# Patient Record
Sex: FEMALE | Race: AMERICAN INDIAN OR ALASKA NATIVE
[De-identification: names, ages, dates, MRNs, and addresses within clinical notes are randomized per-mention and may not be internally consistent; named-entity substitution may affect disease eponyms.]

---

## 2021-05-26 ENCOUNTER — HOSPITAL ENCOUNTER (EMERGENCY)
Dept: HOSPITAL 5 - ED | Age: 26
Discharge: HOME | End: 2021-05-26
Payer: SELF-PAY

## 2021-05-26 VITALS — DIASTOLIC BLOOD PRESSURE: 81 MMHG | SYSTOLIC BLOOD PRESSURE: 126 MMHG

## 2021-05-26 DIAGNOSIS — O26.891: Primary | ICD-10-CM

## 2021-05-26 DIAGNOSIS — Z3A.13: ICD-10-CM

## 2021-05-26 DIAGNOSIS — R10.9: ICD-10-CM

## 2021-05-26 DIAGNOSIS — E86.0: ICD-10-CM

## 2021-05-26 LAB
ALBUMIN SERPL-MCNC: 3.8 G/DL (ref 3.9–5)
ALT SERPL-CCNC: 19 UNITS/L (ref 7–56)
BASOPHILS # (AUTO): 0 K/MM3 (ref 0–0.1)
BASOPHILS NFR BLD AUTO: 0.6 % (ref 0–1.8)
BILIRUB UR QL STRIP: (no result)
BLOOD UR QL VISUAL: (no result)
BUN SERPL-MCNC: 3 MG/DL (ref 7–17)
BUN/CREAT SERPL: 6 %
CALCIUM SERPL-MCNC: 9.1 MG/DL (ref 8.4–10.2)
EOSINOPHIL # BLD AUTO: 0.1 K/MM3 (ref 0–0.4)
EOSINOPHIL NFR BLD AUTO: 2.2 % (ref 0–4.3)
HCT VFR BLD CALC: 35.7 % (ref 30.3–42.9)
HEMOLYSIS INDEX: 6
HGB BLD-MCNC: 11.7 GM/DL (ref 10.1–14.3)
LYMPHOCYTES # BLD AUTO: 1.8 K/MM3 (ref 1.2–5.4)
LYMPHOCYTES NFR BLD AUTO: 31 % (ref 13.4–35)
MCHC RBC AUTO-ENTMCNC: 33 % (ref 30–34)
MCV RBC AUTO: 90 FL (ref 79–97)
MONOCYTES # (AUTO): 0.3 K/MM3 (ref 0–0.8)
MONOCYTES % (AUTO): 4.8 % (ref 0–7.3)
MUCOUS THREADS #/AREA URNS HPF: (no result) /HPF
PH UR STRIP: 5 [PH] (ref 5–7)
PLATELET # BLD: 241 K/MM3 (ref 140–440)
RBC # BLD AUTO: 3.94 M/MM3 (ref 3.65–5.03)
RBC #/AREA URNS HPF: 4 /HPF (ref 0–6)
UROBILINOGEN UR-MCNC: < 2 MG/DL (ref ?–2)
WBC #/AREA URNS HPF: 5 /HPF (ref 0–6)

## 2021-05-26 PROCEDURE — 83690 ASSAY OF LIPASE: CPT

## 2021-05-26 PROCEDURE — 99284 EMERGENCY DEPT VISIT MOD MDM: CPT

## 2021-05-26 PROCEDURE — 85025 COMPLETE CBC W/AUTO DIFF WBC: CPT

## 2021-05-26 PROCEDURE — 80053 COMPREHEN METABOLIC PANEL: CPT

## 2021-05-26 PROCEDURE — 81001 URINALYSIS AUTO W/SCOPE: CPT

## 2021-05-26 PROCEDURE — 84702 CHORIONIC GONADOTROPIN TEST: CPT

## 2021-05-26 PROCEDURE — 83735 ASSAY OF MAGNESIUM: CPT

## 2021-05-26 PROCEDURE — 76802 OB US < 14 WKS ADDL FETUS: CPT

## 2021-05-26 PROCEDURE — 36415 COLL VENOUS BLD VENIPUNCTURE: CPT

## 2021-05-26 PROCEDURE — 76801 OB US < 14 WKS SINGLE FETUS: CPT

## 2021-05-26 NOTE — EMERGENCY DEPARTMENT REPORT
ED Female  HPI





- General


Stated complaint: PREGNANT, ABD PAIN, HEADACHE


Time Seen by Provider: 21 14:46


Source: patient


Mode of arrival: Ambulatory


Limitations: No Limitations





- History of Present Illness


Initial comments: 


25-year-old female presents to the ER today with complaints of abdominal pain.  

Patient states that the pain started this morning.  She described as a constant 

pain.  Patient states that she is currently pregnant.  She states that her last 

menstrual cycle was sometime in 2021 but she is not exactly sure how 

many weeks she is.  She did have her first OB/GYN appointment this past Monday 

during which time a day did lab tests and made a follow-up appointment for the 

next 4 weeks.  She states that is an OB/GYN at [place].  Patient denies any 

associated bleeding with the pain.  She denies any abnormal discharge.  She 

denies any nausea, vomiting or UTI symptoms.  She is G3, P2 Ab1.


MD Complaint: pelvic pain, other (pregnant)


-: Sudden (this morning )





- Related Data


                                    Allergies











Allergy/AdvReac Type Severity Reaction Status Date / Time


 


No Known Allergies Allergy   Verified 21 14:47














ED Review of Systems


ROS: 


Stated complaint: PREGNANT, ABD PAIN, HEADACHE


Other details as noted in HPI





Comment: All other systems reviewed and negative


Constitutional: denies: chills, fever


Eyes: denies: eye pain, eye discharge, vision change


ENT: denies: ear pain, throat pain, dental pain, hearing loss, epistaxis, 

congestion


Respiratory: denies: cough, shortness of breath, SOB with exertion, SOB at rest,

wheezing


Cardiovascular: denies: chest pain, palpitations, dyspnea on exertion, 

orthopnea, edema, syncope, paroxysmal nocturnal dyspnea


Gastrointestinal: abdominal pain.  denies: nausea, vomiting, diarrhea, 

constipation, hematemesis, hematochezia


Genitourinary: denies: urgency, dysuria, frequency, hematuria, discharge, 

abnormal menses, dyspareunia


Musculoskeletal: denies: back pain, joint swelling, arthralgia


Skin: denies: rash, lesions, change in color, change in hair/nails, pruritus


Neurological: denies: headache, weakness, numbness, paresthesias, confusion, 

abnormal gait, vertigo


Psychiatric: denies: anxiety, depression, auditory hallucinations, visual 

hallucinations, homicidal thoughts, suicidal thoughts


Hematological/Lymphatic: denies: easy bleeding, easy bruising





ED Past Medical Hx





- Social History


Smoking Status: Never Smoker


Substance Use Type: None





ED Course


                                   Vital Signs











  21





  14:43


 


Temperature 98 F


 


Pulse Rate 92 H


 


Respiratory 16





Rate 


 


Blood Pressure 126/81


 


O2 Sat by Pulse 100





Oximetry 














ED Medical Decision Making





- Lab Data


Result diagrams: 


                                 21 15:14





                                 21 15:14





- Radiology Data


Radiology results: report reviewed


Patient: KINZA PETTY                                                          

     MR#: [id number]  


7          


: 1995                                                                

Acct:[id number]      


 


Age/Sex: 25 / F                                                                

ADM Date: 21     


 


Loc: ED       


Attending Dr:   


 


 


Ordering Physician: MADELINE ARCE  


Date of Service: 21  


Procedure(s): US OB <= 14 wk fetus add gest  


Accession Number(s): [id number]  


 


cc: MADELINE ARCE   


 


 


US OB <= 14 wk fetus add gest  


 


 INDICATION:  


 lower abd pain/+preg/lmc 2021.  


 


 TECHNIQUE:  


 Transabdominal.  


 


 COMPARISON:  


 None.  


 


 FINDINGS:  


 There is a single intrauterine pregnancy.   


 


 Biparietal Diameter = 1.9 cm   


 Head Circumference = 8.2 cm   


 Abdominal Circumference = 7.5 cm   


 Femur Length = 1.5 cm   


 


 


 Fetal Heart Rate: 138  beats per minute.   


 


 Fetal Position: cephalic.   


 Cervix: Not well evaluated on this exam.  


 Placenta: Not well evaluated on this exam  


 Amniotic Fluid Volume: normal   


 Maternal Adnexa: No significant abnormality.  


 


 IMPRESSION:  


 1. Single, living intrauterine pregnancy with estimated sonographic age of 13 

weeks, 5  day(s).  


 2. No significant sonographic abnormality.  


 


 


 Signer Name: Rex Jones MD   


 Signed: 2021 7:02 PM  


 Workstation Name: [id number]   


 


 


Transcribed By: CS  


Dictated By: Rex Jones MD  


Electronically Authenticated By: Rex Jones MD    


Signed Date/Time: 21                                


 


 


 


DD/DT: 21                                                            

 


TD/TT:








- Medical Decision Making


Quant HCG 54120 with US showing single live IUP measuring at about 13 weeks and 

5 days; Nothing else acute noted seen on US


CBC/CMP and UA unremarkable


Pt currently resting comfortably. She is not in any pain distress. She is not 

toxic or ill appearing and appears well hydrated.  He is neurologically intact 

with a normal gait in the ER.  She has non surgical abd exam and therefore not 

concern for acute abdomen requiring  additional imaging/testing indicated at 

this time. Her VS stable. 


Discussed US/lab results with patient. 


Recommend tylenol for pain but recommend close f/u with OBGYN. 


Pt expressed understanding of instructions and agreed with plan. 


Pt was stable at time of d/c. 





Critical care attestation.: 


If time is entered above; I have spent that time in minutes in the direct care 

of this critically ill patient, excluding procedure time.








ED Disposition


Clinical Impression: 


 Abdominal pain during pregnancy, 13 weeks gestation of pregnancy





Disposition: DC- TO HOME OR SELFCARE


Is pt being admited?: No


Does the pt Need Aspirin: No


Condition: Stable


Instructions:  Abdominal Pain During Pregnancy


Additional Instructions: 


Recommend taking Tylenol as needed for pain.  Keep your appointment with the 

OB/GYN next month.  Return to ER if your symptoms changes or worsens in any way.


Referrals: 


PRIMARY CARE,MD [Primary Care Provider] - 3-5 Days


Forms:  Work/School Release Form(ED)


Time of Disposition: 19:09

## 2021-05-26 NOTE — ULTRASOUND REPORT
US OB <= 14 wk fetus add gest



INDICATION:

lower abd pain/+preg/lmc feb 2021.



TECHNIQUE:

Transabdominal.



COMPARISON:

None.



FINDINGS:

There is a single intrauterine pregnancy. 



Biparietal Diameter = 1.9 cm 

Head Circumference = 8.2 cm 

Abdominal Circumference = 7.5 cm 

Femur Length = 1.5 cm 





Fetal Heart Rate: 138  beats per minute. 



Fetal Position: cephalic. 

Cervix: Not well evaluated on this exam.

Placenta: Not well evaluated on this exam

Amniotic Fluid Volume: normal 

Maternal Adnexa: No significant abnormality.



IMPRESSION:

1. Single, living intrauterine pregnancy with estimated sonographic age of 13 weeks, 5  day(s).

2. No significant sonographic abnormality.

 



Signer Name: Rex Jones MD 

Signed: 5/26/2021 7:02 PM

Workstation Name: [id number]

## 2021-12-11 ENCOUNTER — HOSPITAL ENCOUNTER (EMERGENCY)
Dept: HOSPITAL 5 - ED | Age: 26
Discharge: HOME | End: 2021-12-11
Payer: MEDICAID

## 2021-12-11 VITALS — SYSTOLIC BLOOD PRESSURE: 149 MMHG | DIASTOLIC BLOOD PRESSURE: 98 MMHG

## 2021-12-11 LAB
ALBUMIN SERPL-MCNC: 4 G/DL (ref 3.9–5)
ALT SERPL-CCNC: 21 UNITS/L (ref 7–56)
APTT BLD: 31 SEC. (ref 24.2–36.6)
BASOPHILS # (AUTO): 0.1 K/MM3 (ref 0–0.1)
BASOPHILS NFR BLD AUTO: 1.1 % (ref 0–1.8)
BILIRUB UR QL STRIP: (no result)
BLOOD UR QL VISUAL: (no result)
BUN SERPL-MCNC: 9 MG/DL (ref 7–17)
BUN/CREAT SERPL: 15 %
CALCIUM SERPL-MCNC: 9.4 MG/DL (ref 8.4–10.2)
EOSINOPHIL # BLD AUTO: 0.1 K/MM3 (ref 0–0.4)
EOSINOPHIL NFR BLD AUTO: 2.4 % (ref 0–4.3)
FIBRINOGEN PPP-MCNC: 304 MG/DL (ref 211–480)
HCT VFR BLD CALC: 45 % (ref 30.3–42.9)
HEMOLYSIS INDEX: 24
HGB BLD-MCNC: 14.3 GM/DL (ref 10.1–14.3)
INR PPP: 0.92 (ref 0.87–1.13)
LYMPHOCYTES # BLD AUTO: 2.5 K/MM3 (ref 1.2–5.4)
LYMPHOCYTES NFR BLD AUTO: 51.4 % (ref 13.4–35)
MCHC RBC AUTO-ENTMCNC: 32 % (ref 30–34)
MCV RBC AUTO: 88 FL (ref 79–97)
MONOCYTES # (AUTO): 0.5 K/MM3 (ref 0–0.8)
MONOCYTES % (AUTO): 9.4 % (ref 0–7.3)
PH UR STRIP: 6 [PH] (ref 5–7)
PLATELET # BLD: 276 K/MM3 (ref 140–440)
PROT UR STRIP-MCNC: (no result) MG/DL
RBC # BLD AUTO: 5.12 M/MM3 (ref 3.65–5.03)
RBC #/AREA URNS HPF: 3 /HPF (ref 0–6)
UROBILINOGEN UR-MCNC: < 2 MG/DL (ref ?–2)
WBC #/AREA URNS HPF: 3 /HPF (ref 0–6)

## 2021-12-11 PROCEDURE — 99283 EMERGENCY DEPT VISIT LOW MDM: CPT

## 2021-12-11 PROCEDURE — 85384 FIBRINOGEN ACTIVITY: CPT

## 2021-12-11 PROCEDURE — 80053 COMPREHEN METABOLIC PANEL: CPT

## 2021-12-11 PROCEDURE — 85610 PROTHROMBIN TIME: CPT

## 2021-12-11 PROCEDURE — 85025 COMPLETE CBC W/AUTO DIFF WBC: CPT

## 2021-12-11 PROCEDURE — 36415 COLL VENOUS BLD VENIPUNCTURE: CPT

## 2021-12-11 PROCEDURE — 81001 URINALYSIS AUTO W/SCOPE: CPT

## 2021-12-11 PROCEDURE — 85730 THROMBOPLASTIN TIME PARTIAL: CPT

## 2021-12-11 NOTE — EMERGENCY DEPARTMENT REPORT
- General


Chief Complaint: Skin/Abscess/Foreign Body


Stated Complaint: INCISION RECHECK


Time Seen by Provider: 21 11:26


Source: patient


Mode of arrival: Ambulatory


Limitations: No Limitations





- History of Present Illness


Initial Comments: 





26-year-old female here with concern for possible wound infection of her C-

section incision. She notes that she underwent  about 2 weeks ago. For 

the past 4 to 5 days she has noticed foul odor. She states she has not had 

fevers chills. She has not had any pain. She has not noted any drainage. She 

states she came to the emergency department despite having an appointment on 

Monday as her boyfriend had her concerned.





- Related Data


                                  Previous Rx's











 Medication  Instructions  Recorded  Last Taken  Type


 


Fluconazole [Diflucan TAB] 200 mg PO QDAY 1 Days #1 tablet 21 Unknown Rx


 


Sulfamethoxazole/Trimethoprim 1 each PO BID 7 Days #14 tablet 21 Unknown 

Rx





[Bactrim DS TAB]    


 


cephALEXin [Keflex] 500 mg PO Q6HR 7 Days #28 cap 21 Unknown Rx











                                    Allergies











Allergy/AdvReac Type Severity Reaction Status Date / Time


 


No Known Allergies Allergy   Verified 21 14:47














ED Review of Systems


ROS: 


Stated complaint: INCISION RECHECK


Other details as noted in HPI





Constitutional: denies: chills, fever


Eyes: denies: eye pain


ENT: denies: throat pain


Respiratory: denies: cough


Cardiovascular: denies: chest pain


Endocrine: no symptoms reported


Gastrointestinal: denies: abdominal pain, nausea, vomiting


Genitourinary: denies: urgency, dysuria


Musculoskeletal: denies: back pain


Skin: rash


Neurological: denies: headache, weakness


Psychiatric: denies: anxiety, depression


Hematological/Lymphatic: as per HPI





ED Past Medical Hx





- Social History


Smoking Status: Never Smoker


Substance Use Type: None





- Medications


Home Medications: 


                                Home Medications











 Medication  Instructions  Recorded  Confirmed  Last Taken  Type


 


Fluconazole [Diflucan TAB] 200 mg PO QDAY 1 Days #1 tablet 21  Unknown Rx


 


Sulfamethoxazole/Trimethoprim 1 each PO BID 7 Days #14 tablet 21  Unknown 

Rx





[Bactrim DS TAB]     


 


cephALEXin [Keflex] 500 mg PO Q6HR 7 Days #28 cap 21  Unknown Rx














ED Physical Exam





- General


Limitations: No Limitations


General appearance: alert, in no apparent distress





- Head


Head exam: Present: atraumatic, normocephalic





- Eye


Eye exam: Present: normal appearance


Pupils: Present: normal accommodation





- ENT


ENT exam: Present: mucous membranes moist





- Neck


Neck exam: Present: normal inspection





- Respiratory


Respiratory exam: Present: normal lung sounds bilaterally.  Absent: respiratory 

distress





- Cardiovascular


Cardiovascular Exam: Present: regular rate, normal rhythm.  Absent: systolic 

murmur, diastolic murmur, rubs, gallop





- GI/Abdominal


GI/Abdominal exam: Present: soft, normal bowel sounds, other (Patient has a 

Pfannenstiel incision. There are some small areas of dehiscence but wound is 

mostly intact. There is no drainage. There is odor. There is also moistness to 

the wound.)





- Rectal


Rectal exam: Present: deferred





- Extremities Exam


Extremities exam: Present: normal inspection





- Back Exam


Back exam: Present: normal inspection





- Neurological Exam


Neurological exam: Present: alert, oriented X3





- Psychiatric


Psychiatric exam: Present: normal affect, normal mood





- Skin


Skin exam: Present: other (See above and abdominal exam)





ED Course





                                   Vital Signs











  21





  10:47


 


Temperature 98.8 F


 


Pulse Rate 67


 


Respiratory 20





Rate 


 


Blood Pressure 156/92





[Right] 


 


O2 Sat by Pulse 98





Oximetry 














ED Medical Decision Making





- Medical Decision Making





This is a 26-year-old female here with complaint of possible wound infection. 

She has not had pain or fevers chills but notes a foul odor to the midline scar.

 It is moist. Will give antibiotics and Diflucan. Patient is instructed to 

follow-up with scheduled with her OB on Monday.


Critical care attestation.: 


If time is entered above; I have spent that time in minutes in the direct care 

of this critically ill patient, excluding procedure time.








ED Disposition


Clinical Impression: 


 Wound infection





Disposition:  HOME / SELF CARE / HOMELESS


Is pt being admited?: No


Does the pt Need Aspirin: No


Condition: Stable


Instructions:  Cellulitis, Adult


Prescriptions: 


Sulfamethoxazole/Trimethoprim [Bactrim DS TAB] 1 each PO BID 7 Days #14 tablet


Fluconazole [Diflucan TAB] 200 mg PO QDAY 1 Days #1 tablet


cephALEXin [Keflex] 500 mg PO Q6HR 7 Days #28 cap


Referrals: 


PRIMARY CARE,MD [Primary Care Provider] - 3-5 Days

## 2021-12-11 NOTE — EMERGENCY DEPARTMENT REPORT
ED General Adult HPI





- General


Chief complaint: Skin/Abscess/Foreign Body


Stated complaint: INCISION RECHECK


Time Seen by Provider: 12/11/21 11:26


Source: patient


Mode of arrival: Ambulatory


Limitations: No Limitations





- History of Present Illness


Severity scale (0 -10): 0





- Related Data


                                  Previous Rx's











 Medication  Instructions  Recorded  Last Taken  Type


 


Fluconazole [Diflucan TAB] 200 mg PO QDAY 1 Days #1 tablet 12/11/21 Unknown Rx


 


Sulfamethoxazole/Trimethoprim 1 each PO BID 7 Days #14 tablet 12/11/21 Unknown 

Rx





[Bactrim DS TAB]    


 


cephALEXin [Keflex] 500 mg PO Q6HR 7 Days #28 cap 12/11/21 Unknown Rx











                                    Allergies











Allergy/AdvReac Type Severity Reaction Status Date / Time


 


No Known Allergies Allergy   Verified 05/26/21 14:47














ED Review of Systems


ROS: 


Stated complaint: INCISION RECHECK


Other details as noted in HPI





Constitutional: denies: chills, fever


Eyes: denies: eye pain


ENT: denies: throat pain


Respiratory: denies: cough


Cardiovascular: denies: chest pain


Endocrine: no symptoms reported


Gastrointestinal: denies: abdominal pain, nausea, vomiting


Genitourinary: denies: urgency, dysuria


Musculoskeletal: denies: back pain


Skin: rash


Neurological: denies: headache, weakness


Psychiatric: denies: anxiety, depression


Hematological/Lymphatic: as per HPI





ED Past Medical Hx





- Social History


Smoking Status: Never Smoker


Substance Use Type: None





- Medications


Home Medications: 


                                Home Medications











 Medication  Instructions  Recorded  Confirmed  Last Taken  Type


 


Fluconazole [Diflucan TAB] 200 mg PO QDAY 1 Days #1 tablet 12/11/21  Unknown Rx


 


Sulfamethoxazole/Trimethoprim 1 each PO BID 7 Days #14 tablet 12/11/21  Unknown 

Rx





[Bactrim DS TAB]     


 


cephALEXin [Keflex] 500 mg PO Q6HR 7 Days #28 cap 12/11/21  Unknown Rx














ED Physical Exam





- General


Limitations: No Limitations


General appearance: alert, in no apparent distress





ED Course





                                   Vital Signs











  12/11/21 12/11/21 12/11/21





  10:47 12:17 14:46


 


Temperature 98.8 F 98.0 F 


 


Pulse Rate 67 70 71


 


Respiratory 20 14 18





Rate   


 


Blood Pressure 156/92 174/108 137/95





[Right]   


 


O2 Sat by Pulse 98 100 98





Oximetry   














  12/11/21





  15:58


 


Temperature 


 


Pulse Rate 72


 


Respiratory 18





Rate 


 


Blood Pressure 149/98





[Right] 


 


O2 Sat by Pulse 99





Oximetry 














ED Medical Decision Making





- Lab Data


Result diagrams: 


                                 12/11/21 14:52





                                 12/11/21 14:52


Critical care attestation.: 


If time is entered above; I have spent that time in minutes in the direct care 

of this critically ill patient, excluding procedure time.








ED Disposition


Disposition: 01 HOME / SELF CARE / HOMELESS


Condition: Stable


Instructions:  Cellulitis, Adult


Prescriptions: 


Sulfamethoxazole/Trimethoprim [Bactrim DS TAB] 1 each PO BID 7 Days #14 tablet


Fluconazole [Diflucan TAB] 200 mg PO QDAY 1 Days #1 tablet


cephALEXin [Keflex] 500 mg PO Q6HR 7 Days #28 cap


Referrals: 


PRIMARY CARE,MD [Primary Care Provider] - 3-5 Days